# Patient Record
Sex: MALE | Race: WHITE | NOT HISPANIC OR LATINO | URBAN - METROPOLITAN AREA
[De-identification: names, ages, dates, MRNs, and addresses within clinical notes are randomized per-mention and may not be internally consistent; named-entity substitution may affect disease eponyms.]

---

## 2017-02-16 ENCOUNTER — COMMUNICATION - HEALTHEAST (OUTPATIENT)
Dept: PHYSICAL MEDICINE AND REHAB | Facility: CLINIC | Age: 52
End: 2017-02-16

## 2017-02-16 ENCOUNTER — AMBULATORY - HEALTHEAST (OUTPATIENT)
Dept: PHYSICAL MEDICINE AND REHAB | Facility: CLINIC | Age: 52
End: 2017-02-16

## 2017-02-16 DIAGNOSIS — Z98.1 S/P CERVICAL SPINAL FUSION: ICD-10-CM

## 2017-03-07 ENCOUNTER — HOSPITAL ENCOUNTER (OUTPATIENT)
Dept: PHYSICAL MEDICINE AND REHAB | Facility: CLINIC | Age: 52
Discharge: HOME | End: 2017-03-07
Attending: PHYSICIAN ASSISTANT

## 2017-03-07 DIAGNOSIS — G89.18 POST-OPERATIVE PAIN: ICD-10-CM

## 2017-03-07 RX ORDER — LOSARTAN POTASSIUM 50 MG/1
50 TABLET ORAL DAILY
Status: SHIPPED | COMMUNITY
Start: 2017-03-07

## 2017-03-07 RX ORDER — MONTELUKAST SODIUM 10 MG/1
10 TABLET ORAL AT BEDTIME
Status: SHIPPED | COMMUNITY
Start: 2017-03-07

## 2017-03-07 RX ORDER — AMLODIPINE BESYLATE 10 MG/1
10 TABLET ORAL DAILY
Status: SHIPPED | COMMUNITY
Start: 2017-03-07

## 2017-03-13 ENCOUNTER — RECORDS - HEALTHEAST (OUTPATIENT)
Dept: RADIOLOGY | Facility: CLINIC | Age: 52
End: 2017-03-13

## 2017-05-08 ENCOUNTER — RECORDS - HEALTHEAST (OUTPATIENT)
Dept: RADIOLOGY | Facility: CLINIC | Age: 52
End: 2017-05-08

## 2021-06-09 NOTE — PROGRESS NOTES
SPINE SURGERY FOLLOWUP  NOTE    Ulysses Khan comes today in f/u after prior acdf.  His upper extremity pain has resolved.  He has residual neck pain is quite minor for him.  He is back at work.  He requires a muscle relaxer for some pain relief at the end of the day.  He has been taking tizanidine he no longer feels as though it's effective for him.  He is wondering if there is another option.        The pts PMH, PSH, ROS, Meds, Allergies, SH, FH are all unchanged and summarized in the pts health history from last visit         PHYSICAL EXAM:   Constitutional:   Visit Vitals     /78     Pulse 74     SpO2 95%        Mental Status: A & O in no acute distress.  Affect is appropriate.  Speech is fluent.  Recent and remote memory are intact.  Attention span and concentration are normal.        Motor: No pronator drift of upper extremity. Normal bulk and tone all muscle groups of upper and lower extremities.    Musculoskeletal: Tender to palpation cervcial spine.  ROM limited in all directions.      Sensory: Sensation intact bilaterally to light touch.      Coordination:   Heel/toe/ gait intact.  nml tandem gait      Reflexes; supinator, biceps, triceps, knee/ ankle jerk intact.  neg hoffmans/   neg babinski/ clonus.    IMAGING:   I personally reviewed all radiographic images   2v cervical xrays were reviewed with the patient  -Hardware is intact and stable appearance of developing fusion is present.  Mild adjacent level spondylosis.  No fractures no instability.  Straightening of the normal cervical lordosis is present.     CONSULTATION ASSESSMENT AND PLAN:    6 months acdf C6-7. Neck pain. Low back pain  -Edwin is doing quite well he's much better than he was prior to surgical see him back in 6 months for 1 year postoperative visit.  He is going to repeat his lumbar epidural injection.  If the injections fail to give him the relief he is looking for in the future he'll follow up with us with updated imaging.  He  has any other questions or concerns will not hesitate to let me know    I spent more than 15 minutes in this apt, examining the pt, reviewing the scans, reviewing notes from chart, discussing treatment options with risks and benefits and coordinating care. >50 % clinic time was spent in face to face counseling and coordinating care    Ky Hooper  /Dr. Tamera MD      CC:     Tirso Gaytan MD  [facility] [place] [address] [place] [id number]